# Patient Record
Sex: FEMALE | Race: WHITE | NOT HISPANIC OR LATINO | URBAN - METROPOLITAN AREA
[De-identification: names, ages, dates, MRNs, and addresses within clinical notes are randomized per-mention and may not be internally consistent; named-entity substitution may affect disease eponyms.]

---

## 2017-06-26 ENCOUNTER — NEW PATIENT (OUTPATIENT)
Dept: URBAN - METROPOLITAN AREA CLINIC 51 | Facility: CLINIC | Age: 72
End: 2017-06-26
Payer: MEDICARE

## 2017-06-26 DIAGNOSIS — H25.12 AGE-RELATED NUCLEAR CATARACT, LEFT EYE: Primary | ICD-10-CM

## 2017-06-26 PROCEDURE — 92004 COMPRE OPH EXAM NEW PT 1/>: CPT | Performed by: OPTOMETRIST

## 2017-06-26 ASSESSMENT — INTRAOCULAR PRESSURE
OS: 17
OD: 13

## 2017-06-26 ASSESSMENT — VISUAL ACUITY
OD: 20/25
OS: 20/25

## 2018-08-07 ENCOUNTER — FOLLOW UP ESTABLISHED (OUTPATIENT)
Dept: URBAN - METROPOLITAN AREA CLINIC 51 | Facility: CLINIC | Age: 73
End: 2018-08-07
Payer: MEDICARE

## 2018-08-07 PROCEDURE — 92134 CPTRZ OPH DX IMG PST SGM RTA: CPT | Performed by: OPTOMETRIST

## 2018-08-07 PROCEDURE — 92014 COMPRE OPH EXAM EST PT 1/>: CPT | Performed by: OPTOMETRIST

## 2018-08-07 PROCEDURE — ECBLA EYEECO CDE BASIC W/BEAD-BLACK GOGGLE XL: CUSTOM | Performed by: OPTOMETRIST

## 2018-08-07 ASSESSMENT — INTRAOCULAR PRESSURE
OD: 12
OS: 11

## 2018-08-07 ASSESSMENT — VISUAL ACUITY
OS: 20/25
OD: 20/20

## 2018-08-07 ASSESSMENT — KERATOMETRY
OS: 44.35
OD: 44.38

## 2018-08-10 ENCOUNTER — FOLLOW UP ESTABLISHED (OUTPATIENT)
Dept: URBAN - METROPOLITAN AREA CLINIC 51 | Facility: CLINIC | Age: 73
End: 2018-08-10
Payer: MEDICARE

## 2018-08-10 PROCEDURE — 92012 INTRM OPH EXAM EST PATIENT: CPT | Performed by: OPTOMETRIST

## 2018-08-10 PROCEDURE — 83861 MICROFLUID ANALY TEARS: CPT | Performed by: OPTOMETRIST

## 2018-08-10 PROCEDURE — 83516 IMMUNOASSAY NONANTIBODY: CPT | Performed by: OPTOMETRIST

## 2018-08-10 PROCEDURE — 0330T TEAR FILM IMG UNI/BI W/I&R: CPT | Performed by: OPTOMETRIST

## 2018-08-21 ENCOUNTER — FOLLOW UP ESTABLISHED (OUTPATIENT)
Dept: URBAN - METROPOLITAN AREA CLINIC 44 | Facility: CLINIC | Age: 73
End: 2018-08-21
Payer: MEDICARE

## 2018-08-21 PROCEDURE — 92004 COMPRE OPH EXAM NEW PT 1/>: CPT | Performed by: OPHTHALMOLOGY

## 2018-08-21 RX ORDER — CYCLOSPORINE 0.5 MG/ML
0.05 % EMULSION OPHTHALMIC
Qty: 1 | Refills: 6 | Status: INACTIVE
Start: 2018-08-21 | End: 2018-08-23

## 2018-08-21 ASSESSMENT — INTRAOCULAR PRESSURE
OS: 11
OD: 11

## 2018-09-19 ENCOUNTER — FOLLOW UP ESTABLISHED (OUTPATIENT)
Dept: URBAN - METROPOLITAN AREA CLINIC 44 | Facility: CLINIC | Age: 73
End: 2018-09-19
Payer: MEDICARE

## 2018-09-19 DIAGNOSIS — H11.822 CONJUNCTIVOCHALASIS, LEFT EYE: ICD-10-CM

## 2018-09-19 PROCEDURE — 92012 INTRM OPH EXAM EST PATIENT: CPT | Performed by: OPHTHALMOLOGY

## 2022-02-21 ENCOUNTER — OFFICE VISIT (OUTPATIENT)
Dept: URBAN - METROPOLITAN AREA CLINIC 51 | Facility: CLINIC | Age: 77
End: 2022-02-21
Payer: MEDICARE

## 2022-02-21 DIAGNOSIS — H02.209 LAGOPHTHALMOS OF EYE: ICD-10-CM

## 2022-02-21 DIAGNOSIS — H52.4 PRESBYOPIA: ICD-10-CM

## 2022-02-21 DIAGNOSIS — G20 PARKINSON'S DISEASE: ICD-10-CM

## 2022-02-21 DIAGNOSIS — H04.123 DRY EYE SYNDROME OF BILATERAL LACRIMAL GLANDS: Primary | ICD-10-CM

## 2022-02-21 DIAGNOSIS — Z96.1 PRESENCE OF INTRAOCULAR LENS: ICD-10-CM

## 2022-02-21 DIAGNOSIS — H43.313 VITREOUS MEMBRANES AND STRANDS, BILATERAL: ICD-10-CM

## 2022-02-21 DIAGNOSIS — H25.812 COMBINED FORMS OF AGE-RELATED CATARACT, LEFT EYE: ICD-10-CM

## 2022-02-21 DIAGNOSIS — H11.823 CONJUNCTIVOCHALASIS, BILATERAL: ICD-10-CM

## 2022-02-21 PROCEDURE — 92134 CPTRZ OPH DX IMG PST SGM RTA: CPT | Performed by: OPTOMETRIST

## 2022-02-21 PROCEDURE — 92004 COMPRE OPH EXAM NEW PT 1/>: CPT | Performed by: OPTOMETRIST

## 2022-02-21 PROCEDURE — 68761 CLOSE TEAR DUCT OPENING: CPT | Performed by: OPTOMETRIST

## 2022-02-21 ASSESSMENT — VISUAL ACUITY
OD: 20/25
OS: 20/25

## 2022-02-21 ASSESSMENT — INTRAOCULAR PRESSURE
OS: 12
OD: 12

## 2022-02-21 ASSESSMENT — KERATOMETRY
OD: 44.41
OS: 44.06

## 2022-02-21 NOTE — IMPRESSION/PLAN
Impression: Parkinson's disease: G20. Plan: Educated patient of incomplete blink and decreased blink rate 2' Parkinson's. Discussed the importance of diligent dry eye therapy. See plan Z12.485.

## 2022-02-21 NOTE — IMPRESSION/PLAN
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 Plan: Lagophthalmos OS>OD. Discussed the importance of diligent dry eye therapy. See plan W76.062.

## 2022-02-21 NOTE — IMPRESSION/PLAN
Impression: Combined forms of age-related cataract, left eye: H25.812. Plan: Discussed condition and findings with patient. Cataracts are moderate and visually significant. Educated patient that changing glasses will not completely improve vision. Can discuss cataract surgery OS in the future if patient wishes. RTC if notice changes in vision. Monitor.

## 2022-02-21 NOTE — IMPRESSION/PLAN
Impression: Conjunctivochalasis, bilateral: X62.603. Plan: Lower lid retraction/lag OU, causing conjunctivochalasis. Can refer back to Dr. Sanket Tenorio in the future to cauterize if necessary.

## 2022-02-21 NOTE — IMPRESSION/PLAN
Impression: Tear film insufficiency of bilateral lacrimal glands
- also element of exposure dt poor blink (Parkinsons) Plan: Discussed the importance of diligent dry eye therapy due to symptoms 2' Parkinson's. Offered the option of instilling punctal plugs, patient prefers. Risks and benefits discussed. Placed Oasis 0.4mm punctal plugs today in office behind slit lamp without complications. Can discuss starting a gel or ointment at next visit if no improvement in symptoms. Recommend the following dry eye regimen:
1)Increase PF AT's every 1-2 hours while awake. Can use more often if necessary. 2) Focus on manual/ complete blinks RTC in 3-4 weeks for dry eye follow up

## 2022-05-12 ENCOUNTER — OFFICE VISIT (OUTPATIENT)
Dept: URBAN - METROPOLITAN AREA CLINIC 51 | Facility: CLINIC | Age: 77
End: 2022-05-12
Payer: MEDICARE

## 2022-05-12 DIAGNOSIS — G20 PARKINSON'S DISEASE: ICD-10-CM

## 2022-05-12 DIAGNOSIS — H04.123 DRY EYE SYNDROME OF BILATERAL LACRIMAL GLANDS: Primary | ICD-10-CM

## 2022-05-12 DIAGNOSIS — H02.209 LAGOPHTHALMOS OF EYE: ICD-10-CM

## 2022-05-12 PROCEDURE — 99213 OFFICE O/P EST LOW 20 MIN: CPT | Performed by: OPTOMETRIST

## 2022-05-12 PROCEDURE — 92134 CPTRZ OPH DX IMG PST SGM RTA: CPT | Performed by: OPTOMETRIST

## 2022-05-12 NOTE — IMPRESSION/PLAN
Impression: Parkinson's disease: G20. Plan: Educated patient of incomplete blink and decreased blink rate 2' Parkinson's. Discussed the importance of diligent dry eye therapy. See F84.027 plan.

## 2022-05-12 NOTE — IMPRESSION/PLAN
Impression: Tear film insufficiency of bilateral lacrimal glands - prescribed Restasis by outside provider
- also element of exposure dt poor blink (Parkinsons) - punctal plugs in place BLL (0.4mm Oasis punctal plugs) Plan: Dryness persists OD>OS. Discussed the importance of diligent dry eye therapy due to symptoms 2' Parkinson's. Reviewed that dryness can cause fluctuations in vision. Recommend the following dry eye regimen:
1) PF AT's at least 4+ times per day or more OU. Can use more often if necessary. 2) Focus on manual/ complete blinks 3) Gel or ointment in the evenings before going to bed 4) Restasis BID OU (from qhs OU)

## 2022-05-12 NOTE — IMPRESSION/PLAN
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 Plan: Lagophthalmos OS>OD. Discussed the importance of diligent dry eye therapy. See J91.899 plan.

## 2022-06-27 ENCOUNTER — OFFICE VISIT (OUTPATIENT)
Dept: URBAN - METROPOLITAN AREA CLINIC 42 | Facility: CLINIC | Age: 77
End: 2022-06-27
Payer: MEDICARE

## 2022-06-27 DIAGNOSIS — H04.123 TEAR FILM INSUFFICIENCY OF BILATERAL LACRIMAL GLANDS: Primary | ICD-10-CM

## 2022-06-27 DIAGNOSIS — Z96.1 PRESENCE OF INTRAOCULAR LENS: ICD-10-CM

## 2022-06-27 PROCEDURE — 99214 OFFICE O/P EST MOD 30 MIN: CPT | Performed by: OPHTHALMOLOGY

## 2022-06-27 RX ORDER — CYCLOSPORINE 0.5 MG/ML
0.05 % EMULSION OPHTHALMIC
Qty: 180 | Refills: 3 | Status: ACTIVE
Start: 2022-06-27

## 2022-06-27 ASSESSMENT — INTRAOCULAR PRESSURE
OS: 12
OD: 12

## 2022-06-27 NOTE — IMPRESSION/PLAN
Impression: Tear film insufficiency of bilateral lacrimal glands: H04.123. Plan: Continue Systane artificial tears QID OU, Restasis BID OU. Discussed diagnosis in detail with patient. Advised patient of condition. Emphasized and explained compliance. Avoid fans, wears sunglasses and hydration.

## 2023-02-07 ENCOUNTER — OFFICE VISIT (OUTPATIENT)
Dept: URBAN - METROPOLITAN AREA CLINIC 51 | Facility: CLINIC | Age: 78
End: 2023-02-07
Payer: MEDICARE

## 2023-02-07 DIAGNOSIS — H04.123 TEAR FILM INSUFFICIENCY OF BILATERAL LACRIMAL GLANDS: Primary | ICD-10-CM

## 2023-02-07 DIAGNOSIS — G20 PARKINSON'S DISEASE: ICD-10-CM

## 2023-02-07 DIAGNOSIS — H43.813 VITREOUS DEGENERATION, BILATERAL: ICD-10-CM

## 2023-02-07 DIAGNOSIS — Z96.1 PRESENCE OF INTRAOCULAR LENS: ICD-10-CM

## 2023-02-07 DIAGNOSIS — H02.209 LAGOPHTHALMOS OF EYE: ICD-10-CM

## 2023-02-07 PROCEDURE — 92134 CPTRZ OPH DX IMG PST SGM RTA: CPT | Performed by: OPTOMETRIST

## 2023-02-07 PROCEDURE — 92014 COMPRE OPH EXAM EST PT 1/>: CPT | Performed by: OPTOMETRIST

## 2023-02-07 ASSESSMENT — VISUAL ACUITY
OD: 20/20
OS: 20/25

## 2023-02-07 ASSESSMENT — KERATOMETRY
OS: 44.00
OD: 44.63

## 2023-02-07 ASSESSMENT — INTRAOCULAR PRESSURE
OS: 12
OD: 11

## 2023-02-07 NOTE — IMPRESSION/PLAN
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 Plan: Lagophthalmos OS>OD. Discussed the importance of diligent dry eye therapy. See plan S88.328.

## 2023-02-07 NOTE — IMPRESSION/PLAN
Impression: Tear film insufficiency of bilateral lacrimal glands: H04.123. Plan: Educated patient on dry eye condition, symptoms and relief. Eyes do appear very dry OD>OS which accounts for patients complaints. Discussed the importance of diligent at home dry eye regimen 2' Parkinson's and Lagophtalmos. Reviewed FLORY puts patient at risk of infection. Recommend the followin) AT's every 2 hours while awake 2) Gel or ointment in the evening before going to bed 3) Take breaks and focus on complete/manual blinks 4) Restasis BID OU if patient wishes Discussed monitoring every 6 months to ensure no infection noted from dryness. 
RTC in 6 months x dry eye follow up (NO DROPS)

## 2023-03-06 ENCOUNTER — OFFICE VISIT (OUTPATIENT)
Dept: URBAN - METROPOLITAN AREA CLINIC 42 | Facility: CLINIC | Age: 78
End: 2023-03-06
Payer: MEDICARE

## 2023-03-06 DIAGNOSIS — Z96.1 PRESENCE OF INTRAOCULAR LENS: ICD-10-CM

## 2023-03-06 DIAGNOSIS — G20 PARKINSON'S DISEASE: ICD-10-CM

## 2023-03-06 DIAGNOSIS — H11.152 PINGUECULA, LEFT EYE: ICD-10-CM

## 2023-03-06 DIAGNOSIS — H04.123 DRY EYE SYNDROME OF BILATERAL LACRIMAL GLANDS: Primary | ICD-10-CM

## 2023-03-06 DIAGNOSIS — H26.492 OTHER SECONDARY CATARACT, LEFT EYE: ICD-10-CM

## 2023-03-06 PROCEDURE — 99214 OFFICE O/P EST MOD 30 MIN: CPT | Performed by: OPHTHALMOLOGY

## 2023-03-06 ASSESSMENT — INTRAOCULAR PRESSURE
OS: 12
OD: 12

## 2023-03-06 NOTE — IMPRESSION/PLAN
Impression: Dry eye syndrome of bilateral lacrimal glands: H04.123.  Plan: Continue use of ATs 3-4x a day and Restasis BID OU

## 2023-03-06 NOTE — IMPRESSION/PLAN
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 Plan: Findings discussed Continue to monitor Advised patient to use ATs and UV protection

## 2023-07-24 ENCOUNTER — OFFICE VISIT (OUTPATIENT)
Dept: URBAN - METROPOLITAN AREA CLINIC 42 | Facility: CLINIC | Age: 78
End: 2023-07-24
Payer: MEDICARE

## 2023-07-24 DIAGNOSIS — H02.209 LAGOPHTHALMOS OF EYE: ICD-10-CM

## 2023-07-24 DIAGNOSIS — H04.123 DRY EYE SYNDROME OF BILATERAL LACRIMAL GLANDS: Primary | ICD-10-CM

## 2023-07-24 DIAGNOSIS — Z96.1 PRESENCE OF INTRAOCULAR LENS: ICD-10-CM

## 2023-07-24 DIAGNOSIS — H26.492 OTHER SECONDARY CATARACT, LEFT EYE: ICD-10-CM

## 2023-07-24 PROCEDURE — 99213 OFFICE O/P EST LOW 20 MIN: CPT

## 2023-07-24 ASSESSMENT — INTRAOCULAR PRESSURE
OD: 14
OS: 14

## 2024-09-17 ENCOUNTER — OFFICE VISIT (OUTPATIENT)
Dept: URBAN - METROPOLITAN AREA CLINIC 42 | Facility: CLINIC | Age: 79
End: 2024-09-17
Payer: MEDICARE

## 2024-09-17 DIAGNOSIS — H16.223 KERATOCONJUNCTIVITIS SICCA, BILATERAL: Primary | ICD-10-CM

## 2024-09-17 DIAGNOSIS — Z96.1 PRESENCE OF INTRAOCULAR LENS: ICD-10-CM

## 2024-09-17 DIAGNOSIS — H02.209 LAGOPHTHALMOS OF EYE: ICD-10-CM

## 2024-09-17 PROCEDURE — 99213 OFFICE O/P EST LOW 20 MIN: CPT

## 2024-09-17 ASSESSMENT — INTRAOCULAR PRESSURE
OD: 14
OS: 14